# Patient Record
Sex: MALE | Race: WHITE | NOT HISPANIC OR LATINO
[De-identification: names, ages, dates, MRNs, and addresses within clinical notes are randomized per-mention and may not be internally consistent; named-entity substitution may affect disease eponyms.]

---

## 2019-06-09 ENCOUNTER — TRANSCRIPTION ENCOUNTER (OUTPATIENT)
Age: 29
End: 2019-06-09

## 2019-06-10 ENCOUNTER — INPATIENT (INPATIENT)
Facility: HOSPITAL | Age: 29
LOS: 0 days | Discharge: HOME | DRG: 343 | End: 2019-06-10
Attending: GENERAL PRACTICE | Admitting: GENERAL PRACTICE
Payer: COMMERCIAL

## 2019-06-10 ENCOUNTER — RESULT REVIEW (OUTPATIENT)
Age: 29
End: 2019-06-10

## 2019-06-10 VITALS
SYSTOLIC BLOOD PRESSURE: 148 MMHG | RESPIRATION RATE: 15 BRPM | HEART RATE: 65 BPM | DIASTOLIC BLOOD PRESSURE: 78 MMHG | OXYGEN SATURATION: 99 %

## 2019-06-10 VITALS
HEART RATE: 65 BPM | DIASTOLIC BLOOD PRESSURE: 106 MMHG | OXYGEN SATURATION: 99 % | RESPIRATION RATE: 17 BRPM | SYSTOLIC BLOOD PRESSURE: 163 MMHG | TEMPERATURE: 98 F

## 2019-06-10 DIAGNOSIS — K35.80 UNSPECIFIED ACUTE APPENDICITIS: ICD-10-CM

## 2019-06-10 DIAGNOSIS — R10.9 UNSPECIFIED ABDOMINAL PAIN: ICD-10-CM

## 2019-06-10 DIAGNOSIS — R74.8 ABNORMAL LEVELS OF OTHER SERUM ENZYMES: ICD-10-CM

## 2019-06-10 LAB
ALBUMIN SERPL ELPH-MCNC: 4.5 G/DL — SIGNIFICANT CHANGE UP (ref 3.3–5)
ALP SERPL-CCNC: 92 U/L — SIGNIFICANT CHANGE UP (ref 40–120)
ALT FLD-CCNC: 43 U/L — SIGNIFICANT CHANGE UP (ref 12–78)
ANION GAP SERPL CALC-SCNC: 5 MMOL/L — SIGNIFICANT CHANGE UP (ref 5–17)
AST SERPL-CCNC: 17 U/L — SIGNIFICANT CHANGE UP (ref 15–37)
BASOPHILS # BLD AUTO: 0.03 K/UL — SIGNIFICANT CHANGE UP (ref 0–0.2)
BASOPHILS NFR BLD AUTO: 0.4 % — SIGNIFICANT CHANGE UP (ref 0–2)
BILIRUB SERPL-MCNC: 0.8 MG/DL — SIGNIFICANT CHANGE UP (ref 0.2–1.2)
BUN SERPL-MCNC: 14 MG/DL — SIGNIFICANT CHANGE UP (ref 7–23)
CALCIUM SERPL-MCNC: 9.1 MG/DL — SIGNIFICANT CHANGE UP (ref 8.5–10.1)
CHLORIDE SERPL-SCNC: 104 MMOL/L — SIGNIFICANT CHANGE UP (ref 96–108)
CO2 SERPL-SCNC: 30 MMOL/L — SIGNIFICANT CHANGE UP (ref 22–31)
CREAT SERPL-MCNC: 0.99 MG/DL — SIGNIFICANT CHANGE UP (ref 0.5–1.3)
EOSINOPHIL # BLD AUTO: 0.13 K/UL — SIGNIFICANT CHANGE UP (ref 0–0.5)
EOSINOPHIL NFR BLD AUTO: 1.7 % — SIGNIFICANT CHANGE UP (ref 0–6)
GLUCOSE SERPL-MCNC: 98 MG/DL — SIGNIFICANT CHANGE UP (ref 70–99)
HCT VFR BLD CALC: 46.1 % — SIGNIFICANT CHANGE UP (ref 39–50)
HGB BLD-MCNC: 16.5 G/DL — SIGNIFICANT CHANGE UP (ref 13–17)
IMM GRANULOCYTES NFR BLD AUTO: 0.3 % — SIGNIFICANT CHANGE UP (ref 0–1.5)
LIDOCAIN IGE QN: 1962 U/L — HIGH (ref 73–393)
LYMPHOCYTES # BLD AUTO: 1.87 K/UL — SIGNIFICANT CHANGE UP (ref 1–3.3)
LYMPHOCYTES # BLD AUTO: 24.5 % — SIGNIFICANT CHANGE UP (ref 13–44)
MCHC RBC-ENTMCNC: 30.9 PG — SIGNIFICANT CHANGE UP (ref 27–34)
MCHC RBC-ENTMCNC: 35.8 GM/DL — SIGNIFICANT CHANGE UP (ref 32–36)
MCV RBC AUTO: 86.3 FL — SIGNIFICANT CHANGE UP (ref 80–100)
MONOCYTES # BLD AUTO: 0.53 K/UL — SIGNIFICANT CHANGE UP (ref 0–0.9)
MONOCYTES NFR BLD AUTO: 6.9 % — SIGNIFICANT CHANGE UP (ref 2–14)
NEUTROPHILS # BLD AUTO: 5.05 K/UL — SIGNIFICANT CHANGE UP (ref 1.8–7.4)
NEUTROPHILS NFR BLD AUTO: 66.2 % — SIGNIFICANT CHANGE UP (ref 43–77)
NRBC # BLD: 0 /100 WBCS — SIGNIFICANT CHANGE UP (ref 0–0)
PLATELET # BLD AUTO: 236 K/UL — SIGNIFICANT CHANGE UP (ref 150–400)
POTASSIUM SERPL-MCNC: 3.6 MMOL/L — SIGNIFICANT CHANGE UP (ref 3.5–5.3)
POTASSIUM SERPL-SCNC: 3.6 MMOL/L — SIGNIFICANT CHANGE UP (ref 3.5–5.3)
PROT SERPL-MCNC: 7.6 G/DL — SIGNIFICANT CHANGE UP (ref 6–8.3)
RBC # BLD: 5.34 M/UL — SIGNIFICANT CHANGE UP (ref 4.2–5.8)
RBC # FLD: 12.2 % — SIGNIFICANT CHANGE UP (ref 10.3–14.5)
SODIUM SERPL-SCNC: 139 MMOL/L — SIGNIFICANT CHANGE UP (ref 135–145)
WBC # BLD: 7.63 K/UL — SIGNIFICANT CHANGE UP (ref 3.8–10.5)
WBC # FLD AUTO: 7.63 K/UL — SIGNIFICANT CHANGE UP (ref 3.8–10.5)

## 2019-06-10 PROCEDURE — 88304 TISSUE EXAM BY PATHOLOGIST: CPT | Mod: 26

## 2019-06-10 PROCEDURE — ZZZZZ: CPT

## 2019-06-10 PROCEDURE — 86850 RBC ANTIBODY SCREEN: CPT

## 2019-06-10 PROCEDURE — 86901 BLOOD TYPING SEROLOGIC RH(D): CPT

## 2019-06-10 PROCEDURE — 86900 BLOOD TYPING SEROLOGIC ABO: CPT

## 2019-06-10 PROCEDURE — 80053 COMPREHEN METABOLIC PANEL: CPT

## 2019-06-10 PROCEDURE — 88304 TISSUE EXAM BY PATHOLOGIST: CPT

## 2019-06-10 PROCEDURE — 99285 EMERGENCY DEPT VISIT HI MDM: CPT

## 2019-06-10 PROCEDURE — 74177 CT ABD & PELVIS W/CONTRAST: CPT | Mod: 26

## 2019-06-10 PROCEDURE — 85027 COMPLETE CBC AUTOMATED: CPT

## 2019-06-10 PROCEDURE — 96374 THER/PROPH/DIAG INJ IV PUSH: CPT

## 2019-06-10 PROCEDURE — 99285 EMERGENCY DEPT VISIT HI MDM: CPT | Mod: 25

## 2019-06-10 PROCEDURE — 74177 CT ABD & PELVIS W/CONTRAST: CPT

## 2019-06-10 PROCEDURE — 83690 ASSAY OF LIPASE: CPT

## 2019-06-10 PROCEDURE — 36415 COLL VENOUS BLD VENIPUNCTURE: CPT

## 2019-06-10 PROCEDURE — C1889: CPT

## 2019-06-10 RX ORDER — ONDANSETRON 8 MG/1
4 TABLET, FILM COATED ORAL ONCE
Refills: 0 | Status: COMPLETED | OUTPATIENT
Start: 2019-06-10 | End: 2019-06-10

## 2019-06-10 RX ORDER — HYDROMORPHONE HYDROCHLORIDE 2 MG/ML
0.5 INJECTION INTRAMUSCULAR; INTRAVENOUS; SUBCUTANEOUS
Refills: 0 | Status: DISCONTINUED | OUTPATIENT
Start: 2019-06-10 | End: 2019-06-10

## 2019-06-10 RX ORDER — IOHEXOL 300 MG/ML
30 INJECTION, SOLUTION INTRAVENOUS ONCE
Refills: 0 | Status: COMPLETED | OUTPATIENT
Start: 2019-06-10 | End: 2019-06-10

## 2019-06-10 RX ORDER — CEFOTETAN DISODIUM 1 G
2 VIAL (EA) INJECTION ONCE
Refills: 0 | Status: DISCONTINUED | OUTPATIENT
Start: 2019-06-10 | End: 2019-06-10

## 2019-06-10 RX ORDER — IBUPROFEN 200 MG
400 TABLET ORAL EVERY 6 HOURS
Refills: 0 | Status: DISCONTINUED | OUTPATIENT
Start: 2019-06-10 | End: 2019-06-10

## 2019-06-10 RX ORDER — METOCLOPRAMIDE HCL 10 MG
5 TABLET ORAL ONCE
Refills: 0 | Status: DISCONTINUED | OUTPATIENT
Start: 2019-06-10 | End: 2019-06-10

## 2019-06-10 RX ORDER — SODIUM CHLORIDE 9 MG/ML
1000 INJECTION INTRAMUSCULAR; INTRAVENOUS; SUBCUTANEOUS ONCE
Refills: 0 | Status: COMPLETED | OUTPATIENT
Start: 2019-06-10 | End: 2019-06-10

## 2019-06-10 RX ORDER — IBUPROFEN 200 MG
400 TABLET ORAL
Qty: 0 | Refills: 0 | DISCHARGE
Start: 2019-06-10

## 2019-06-10 RX ORDER — SODIUM CHLORIDE 9 MG/ML
1000 INJECTION, SOLUTION INTRAVENOUS
Refills: 0 | Status: DISCONTINUED | OUTPATIENT
Start: 2019-06-10 | End: 2019-06-10

## 2019-06-10 RX ADMIN — SODIUM CHLORIDE 1000 MILLILITER(S): 9 INJECTION INTRAMUSCULAR; INTRAVENOUS; SUBCUTANEOUS at 11:48

## 2019-06-10 RX ADMIN — ONDANSETRON 4 MILLIGRAM(S): 8 TABLET, FILM COATED ORAL at 12:07

## 2019-06-10 RX ADMIN — IOHEXOL 30 MILLILITER(S): 300 INJECTION, SOLUTION INTRAVENOUS at 11:49

## 2019-06-10 RX ADMIN — SODIUM CHLORIDE 125 MILLILITER(S): 9 INJECTION, SOLUTION INTRAVENOUS at 21:53

## 2019-06-10 NOTE — ASU DISCHARGE PLAN (ADULT/PEDIATRIC) - CARE PROVIDER_API CALL
John Kevin)  ColonRectal Surgery; Surgery  [address] [place] [address]  Phone: [phone]  Fax: [phone]  Follow Up Time:

## 2019-06-10 NOTE — ED PROVIDER NOTE - PHYSICAL EXAMINATION
Gen: Awake, Alert, WD, WN, NAD  Head:  NC/AT  Eyes:  PERRL, EOMI, Conjunctiva pink, lids normal, no scleral icterus  ENT: OP clear, no exudates, no erythema, uvula midline, moist mucus membranes  Neck: supple, nontender, no LAD, trachea midline  Cardiac/CV:  S1 S2, RRR, no M/G/R  Respiratory/Pulm:  CTAB, good air movement, normal resp effort, no wheezes/stridor/retractions/rales/rhonchi  Gastrointestinal/Abdomen:  Soft, + tender over [place], nondistended, +BS, no rebound/guarding  Back:  no CVAT, no MLT  Ext:  warm, well perfused, moving all extremities spontaneously, no peripheral edema, distal pulses intact  Skin: intact, no rash  Neuro:  AAOx3, sensation intact, motor 5/5 x 4 extremities, normal gait, speech clear

## 2019-06-10 NOTE — H&P ADULT - HISTORY OF PRESENT ILLNESS
30 yo male who developed right lower quadrant abdominal pain 3 days ago, pain 9/10, not relieved by anything, nausea, no appetite. Ct Showed acute appendicitis (15mm appendix, stranding), elevated lipase, normal WBC.

## 2019-06-10 NOTE — BRIEF OPERATIVE NOTE - NSICDXBRIEFPREOP_GEN_ALL_CORE_FT
PRE-OP DIAGNOSIS:  Acute appendicitis with localized peritonitis 10-Bakari-2019 20:00:56  John Kevin

## 2019-06-10 NOTE — H&P ADULT - NSHPPHYSICALEXAM_GEN_ALL_CORE
.  VITAL SIGNS:  T(C): 36.9 (06-10-19 @ 12:13), Max: 36.9 (06-10-19 @ 10:39)  T(F): 98.5 (06-10-19 @ 12:13), Max: 98.5 (06-10-19 @ 10:39)  HR: 64 (06-10-19 @ 12:13) (64 - 65)  BP: 124/74 (06-10-19 @ 12:13) (124/74 - 163/106)  BP(mean): --  RR: 16 (06-10-19 @ 12:13) (16 - 17)  SpO2: 99% (06-10-19 @ 12:13) (99% - 99%)  Wt(kg): --    PHYSICAL EXAM:    Constitutional: resting comfortably in bed; NAD  Eyes: PERRL, EOMI, anicteric sclera  ENT: no nasal discharge; uvula midline, no oropharyngeal erythema or exudates  Neck: supple; no JVD or thyromegaly  Respiratory: CTA B/L; no W/R/R, no retractions  Cardiac: +S1/S2; RRR; no murmurs  Gastrointestinal: RLQ tenderness, rebound  Back: spine midline, no bony tenderness or step-offs; no CVAT B/L  Extremities: no clubbing or cyanosis; no peripheral edema  Musculoskeletal: NROM x4; no joint swelling, tenderness or erythema  Vascular: 2+ radial, femoral, DP/PT pulses B/L  Dermatologic: skin warm, dry and intact; no rashes, wounds, or scars  Lymphatic: no submandibular or cervical LAD  Neurologic: AAOx3; CNII-XII grossly intact; no focal deficits  Psychiatric: affect and characteristics of appearance, verbalizations, behaviors are appropriate

## 2019-06-10 NOTE — ED ADULT NURSE NOTE - OBJECTIVE STATEMENT
30 y/o male with dull constant RLQ abdominal pain since Friday. +Nausea. Denies vomiting, diarrhea, fever and urinary symptoms.

## 2019-06-10 NOTE — H&P ADULT - ASSESSMENT
28 yo male with acute appendicitis  -Will take him to OR for lap appendectomy/poss open. Risk/benefit of surgery explained to patient, including but not limited to intrabdominal abscess, wound infection, bleeding, DVT, PE  -IV cefotetan  -NPO  -IVF

## 2019-06-10 NOTE — CONSULT NOTE ADULT - PROBLEM SELECTOR RECOMMENDATION 9
f/u ct a/p, r/o appendicitis, eval for ?possible subclinical pancreatitis  check amylase  lfts wnl  npo/ivf  ppi ppx, anti emetics prn  pain control prn  monitor exam  further recs and work up pending above

## 2019-06-10 NOTE — ED PROVIDER NOTE - CARE PLAN
Principal Discharge DX:	Acute appendicitis, unspecified acute appendicitis type  Secondary Diagnosis:	Acute pancreatitis with infected necrosis, unspecified pancreatitis type

## 2019-06-10 NOTE — BRIEF OPERATIVE NOTE - NSICDXBRIEFPOSTOP_GEN_ALL_CORE_FT
POST-OP DIAGNOSIS:  Acute appendicitis with localized peritonitis 10-Bakari-2019 20:01:06  John Kevin

## 2019-06-10 NOTE — H&P ADULT - NSHPLABSRESULTS_GEN_ALL_CORE
Vital Signs Last 24 Hrs  T(C): 36.9 (10 Bakari 2019 12:13), Max: 36.9 (10 Bakari 2019 10:39)  T(F): 98.5 (10 Bakari 2019 12:13), Max: 98.5 (10 Bakari 2019 10:39)  HR: 64 (10 Bakari 2019 12:13) (64 - 65)  BP: 124/74 (10 Bakari 2019 12:13) (124/74 - 163/106)  BP(mean): --  RR: 16 (10 Bakari 2019 12:13) (16 - 17)  SpO2: 99% (10 Bakari 2019 12:13) (99% - 99%)                          16.5   7.63  )-----------( 236      ( 10 Bakari 2019 12:05 )             46.1       06-10    139  |  104  |  14  ----------------------------<  98  3.6   |  30  |  0.99    Ca    9.1      10 Bakari 2019 12:05    TPro  7.6  /  Alb  4.5  /  TBili  0.8  /  DBili  x   /  AST  17  /  ALT  43  /  AlkPhos  92  06-10      LIVER FUNCTIONS - ( 10 Bakari 2019 12:05 )  Alb: 4.5 g/dL / Pro: 7.6 g/dL / ALK PHOS: 92 U/L / ALT: 43 U/L / AST: 17 U/L / GGT: x             MEDICATIONS  (STANDING):    MEDICATIONS  (PRN):      MEDICATIONS  (STANDING):      < from: CT Abdomen and Pelvis w/ Oral Cont and w/ IV Cont (06.10.19 @ 13:57) >    INDINGS:    LOWER CHEST: Within normal limits.    LIVER: Too small to characterize hypodensity left hepatic lobe likely   cyst.  BILE DUCTS: Normal caliber.  GALLBLADDER: Within normal limits.  SPLEEN: Within normal limits.  PANCREAS: Within normal limits.  ADRENALS: Within normal limits.  KIDNEYS/URETERS: Within normal limits.    BLADDER: Within normal limits.  REPRODUCTIVE ORGANS: Normal for age    BOWEL: No bowel obstruction or acute inflammation. Appendix Abnormal.   Thickened, with appendicolith, fails to fill with contrast.   Periappendiceal fat stranding. Findings compatible with acute appendicitis  PERITONEUM: No ascites or extraluminal gas. No drainable collection.  VESSELS:  Within normal limits.  RETROPERITONEUM: No lymphadenopathy.    ABDOMINAL WALL: Within normal limits.  BONES: Within normal limits.    IMPRESSION:   Acute appendicitis  < end of copied text >

## 2019-06-10 NOTE — CONSULT NOTE ADULT - ASSESSMENT
30yo M c/o abd pain x 3 days, initially upper central abdomen, now migrated to lower abdomen, gi consulted

## 2019-06-10 NOTE — H&P ADULT - NSHPREVIEWOFSYSTEMS_GEN_ALL_CORE
REVIEW OF SYSTEMS:    CONSTITUTIONAL: No weakness, fevers or chills  EYES/ENT: No visual changes;  No vertigo or throat pain   NECK: No pain or stiffness  RESPIRATORY: No cough, wheezing, hemoptysis; No shortness of breath  CARDIOVASCULAR: No chest pain or palpitations  GASTROINTESTINAL:  abdominal  pain.  nausea,no  vomiting, or hematemesis; No diarrhea or constipation. No melena or hematochezia.  GENITOURINARY: No dysuria, frequency or hematuria  NEUROLOGICAL: No numbness or weakness  SKIN: No itching, burning, rashes, or lesions   All other review of systems is negative unless indicated above.

## 2019-06-10 NOTE — ED PROVIDER NOTE - PROGRESS NOTE DETAILS
pt comfortable, tolerating PO contrast, given copy of lab results, aware of elvated lipase, need for CT with IV contrast, agrees with plan, aware may have pancreatitis, st not a regular or heavy drinker, had 2 beers the other day.

## 2019-06-10 NOTE — ASU DISCHARGE PLAN (ADULT/PEDIATRIC) - CALL YOUR DOCTOR IF YOU HAVE ANY OF THE FOLLOWING:
Fever greater than (need to indicate Fahrenheit or Celsius) Bleeding that does not stop/Fever greater than (need to indicate Fahrenheit or Celsius)/Unable to urinate/Pain not relieved by Medications

## 2019-06-10 NOTE — CONSULT NOTE ADULT - SUBJECTIVE AND OBJECTIVE BOX
Chief Complaint:  Patient is a 29y old  Male who presents with a chief complaint of   Heart murmur  No significant past surgical history     HPI: 30yo M c/o abd pain x 3 days, initially upper central abdomen, now migrated to lower abdomen. pt denies trauma, travel, n/v/d, fever, chills, bladder, bowel changes, ha, dizzy, loc, numbness, tingling, no new back pain (other than usual chronic). pt st abd pain achy, feels like gas. none radiating, sometimes changes with sitting up. last normal BM 2 hours ago. pt refusing pain or anti nausea meds at this time.      gi consulted for abd pain/?pancreatitis. chart reviewed. pt seen and examined. pt reports abd pain which started this past friday, came to ed given worsening severity; initially thought gas pains, present in epigastric region now present in rlq, non radiating, never experienced before. [place] [facility] [place] did eat a burrito at [employer] on friday w gf, but no one else sick and felt fine afterwards.  denies associated f/c/n/v/d/c/melena/brbpr. denies recent sick contacts/hospitalizations/abx use,. last bm this am and normal. hx of remote egd for 'eating issues,' normal per pt report. has never had colon. denies prior abd sx. thinks sister may have 'mild crohns' maternal grandmother w hx of stomach ca. meds nc. recently drank 2 beers, social  use, denies drug use/smoking    recent vs/labs reviewed afebrile  no leukocytosis  lipase 1962  ct pending      No Known Allergies            FAMILY HISTORY:        Review of Systems:    General:  No wt loss, fevers, chills, night sweats, fatigue  Eyes:  Good vision, no reported pain  ENT:  No sore throat, pain, runny nose, dysphagia  CV:  No pain, palpitations, no lightheadedness  Resp:  No dyspnea, cough, tachypnea, wheezing  GI: see above  :  No pain, bleeding, incontinence, nocturia  Muscle:  No pain, weakness  Neuro:  No weakness, tingling, memory problems  Psych:  No fatigue, insomnia, mood problems, depression  Endocrine:  No polyuria, polydypsia, cold/heat intolerance  Heme:  No petechiae, ecchymosis, easy bruisability  Skin:  No rash, tattoos, scars, edema    Relevant Family History:   n/c    Relevant Social History: n/c      Physical Exam:    Vital Signs:  Vital Signs Last 24 Hrs  T(C): 36.9 (10 Bakari 2019 12:13), Max: 36.9 (10 Bakari 2019 10:39)  T(F): 98.5 (10 Bakari 2019 12:13), Max: 98.5 (10 Bakari 2019 10:39)  HR: 64 (10 Bakari 2019 12:13) (64 - 65)  BP: 124/74 (10 Bakari 2019 12:13) (124/74 - 163/106)  BP(mean): --  RR: 16 (10 Bakari 2019 12:13) (16 - 17)  SpO2: 99% (10 Bakari 2019 12:13) (99% - 99%)  Daily     Daily     General:  nad non toxic appearing  HEENT:  NC/AT  Chest:  dec bs  Cardiovascular:  Regular rhythm, S1, S2  Abdomen:  soft ttp rlp to deep palpation no guarding mild dt  Extremities:  no  edema  Skin:  No rash  Neuro/Psych:  A&Ox3    Laboratory:                            16.5   7.63  )-----------( 236      ( 10 Bakari 2019 12:05 )             46.1     06-10    139  |  104  |  14  ----------------------------<  98  3.6   |  30  |  0.99    Ca    9.1      10 Bakari 2019 12:05    TPro  7.6  /  Alb  4.5  /  TBili  0.8  /  DBili  x   /  AST  17  /  ALT  43  /  AlkPhos  92  06-10    LIVER FUNCTIONS - ( 10 Bakari 2019 12:05 )  Alb: 4.5 g/dL / Pro: 7.6 g/dL / ALK PHOS: 92 U/L / ALT: 43 U/L / AST: 17 U/L / GGT: x               Amylase Serum--      Lipase [id number]       Ammonia--    Imaging: Chief Complaint:  Patient is a 29y old  Male who presents with a chief complaint of   Heart murmur  No significant past surgical history     HPI: 28yo M c/o abd pain x 3 days, initially upper central abdomen, now migrated to lower abdomen. pt denies trauma, travel, n/v/d, fever, chills, bladder, bowel changes, ha, dizzy, loc, numbness, tingling, no new back pain (other than usual chronic). pt st abd pain achy, feels like gas. none radiating, sometimes changes with sitting up. last normal BM 2 hours ago. pt refusing pain or anti nausea meds at this time.      gi consulted for abd pain/?pancreatitis. chart reviewed. pt seen and examined. pt reports abd pain which started this past friday, came to ed given worsening severity; initially thought gas pains, present in epigastric region now present in rlq, non radiating, never experienced before. prior in [facility] [place]. did eat a burrito at [employer] on friday w gf, but no one else sick and felt fine afterwards.  denies associated f/c/n/v/d/c/melena/brbpr. denies recent sick contacts/hospitalizations/abx use,. last bm this am and normal. hx of remote egd for 'eating issues,' normal per pt report. has never had colon. denies prior abd sx. thinks sister may have 'mild crohns' maternal grandmother w hx of stomach ca. meds nc. recently drank 2 beers, social  use, denies drug use/smoking    recent vs/labs reviewed afebrile  no leukocytosis  lipase 1962  ct pending      No Known Allergies            FAMILY HISTORY:        Review of Systems:    General:  No wt loss, fevers, chills, night sweats, fatigue  Eyes:  Good vision, no reported pain  ENT:  No sore throat, pain, runny nose, dysphagia  CV:  No pain, palpitations, no lightheadedness  Resp:  No dyspnea, cough, tachypnea, wheezing  GI: see above  :  No pain, bleeding, incontinence, nocturia  Muscle:  No pain, weakness  Neuro:  No weakness, tingling, memory problems  Psych:  No fatigue, insomnia, mood problems, depression  Endocrine:  No polyuria, polydypsia, cold/heat intolerance  Heme:  No petechiae, ecchymosis, easy bruisability  Skin:  No rash, tattoos, scars, edema    Relevant Family History:   n/c    Relevant Social History: n/c      Physical Exam:    Vital Signs:  Vital Signs Last 24 Hrs  T(C): 36.9 (10 Bakari 2019 12:13), Max: 36.9 (10 Bakari 2019 10:39)  T(F): 98.5 (10 Bakari 2019 12:13), Max: 98.5 (10 Bakari 2019 10:39)  HR: 64 (10 Bakari 2019 12:13) (64 - 65)  BP: 124/74 (10 Bakari 2019 12:13) (124/74 - 163/106)  BP(mean): --  RR: 16 (10 Bakari 2019 12:13) (16 - 17)  SpO2: 99% (10 Bakari 2019 12:13) (99% - 99%)  Daily     Daily     General:  nad non toxic appearing  HEENT:  NC/AT  Chest:  dec bs  Cardiovascular:  Regular rhythm, S1, S2  Abdomen:  soft ttp rlq to deep palpation no guarding mild dt  Extremities:  no  edema  Skin:  No rash  Neuro/Psych:  A&Ox3    Laboratory:                            16.5   7.63  )-----------( 236      ( 10 Bakari 2019 12:05 )             46.1     06-10    139  |  104  |  14  ----------------------------<  98  3.6   |  30  |  0.99    Ca    9.1      10 Bakari 2019 12:05    TPro  7.6  /  Alb  4.5  /  TBili  0.8  /  DBili  x   /  AST  17  /  ALT  43  /  AlkPhos  92  06-10    LIVER FUNCTIONS - ( 10 Bakari 2019 12:05 )  Alb: 4.5 g/dL / Pro: 7.6 g/dL / ALK PHOS: 92 U/L / ALT: 43 U/L / AST: 17 U/L / GGT: x               Amylase Serum--      Lipase [id number]       Ammonia--    Imaging:

## 2019-11-19 PROBLEM — R01.1 CARDIAC MURMUR, UNSPECIFIED: Chronic | Status: ACTIVE | Noted: 2019-06-10

## 2019-11-20 PROBLEM — Z00.00 ENCOUNTER FOR PREVENTIVE HEALTH EXAMINATION: Status: ACTIVE | Noted: 2019-11-20

## 2019-11-21 ENCOUNTER — APPOINTMENT (OUTPATIENT)
Dept: CT IMAGING | Facility: CLINIC | Age: 29
End: 2019-11-21
Payer: COMMERCIAL

## 2019-11-21 ENCOUNTER — OUTPATIENT (OUTPATIENT)
Dept: OUTPATIENT SERVICES | Facility: HOSPITAL | Age: 29
LOS: 1 days | End: 2019-11-21
Payer: COMMERCIAL

## 2019-11-21 DIAGNOSIS — Z00.8 ENCOUNTER FOR OTHER GENERAL EXAMINATION: ICD-10-CM

## 2019-11-21 PROCEDURE — 70481 CT ORBIT/EAR/FOSSA W/DYE: CPT

## 2019-11-21 PROCEDURE — 70481 CT ORBIT/EAR/FOSSA W/DYE: CPT | Mod: 26

## 2022-08-17 NOTE — ASU DISCHARGE PLAN (ADULT/PEDIATRIC) - NS DC INTERPRETER YES NO
Patient in today for gabapentin refills  100mg BID for DM neuropathy.  Dayron appropriate. Could you please send 6 matt supply. Thanks   No

## 2024-03-09 ENCOUNTER — TRANSCRIPTION ENCOUNTER (OUTPATIENT)
Age: 34
End: 2024-03-09

## 2024-03-09 ENCOUNTER — EMERGENCY (EMERGENCY)
Facility: HOSPITAL | Age: 34
LOS: 0 days | Discharge: HOME | End: 2024-03-10
Attending: HOSPITALIST
Payer: COMMERCIAL

## 2024-03-09 VITALS — HEIGHT: 75 IN | WEIGHT: 240.08 LBS

## 2024-03-09 DIAGNOSIS — R45.0 NERVOUSNESS: ICD-10-CM

## 2024-03-09 PROCEDURE — 99285 EMERGENCY DEPT VISIT HI MDM: CPT | Mod: 25

## 2024-03-09 PROCEDURE — 83880 ASSAY OF NATRIURETIC PEPTIDE: CPT

## 2024-03-09 PROCEDURE — 87086 URINE CULTURE/COLONY COUNT: CPT

## 2024-03-09 PROCEDURE — 84484 ASSAY OF TROPONIN QUANT: CPT

## 2024-03-09 PROCEDURE — 80053 COMPREHEN METABOLIC PANEL: CPT

## 2024-03-09 PROCEDURE — 36415 COLL VENOUS BLD VENIPUNCTURE: CPT

## 2024-03-09 PROCEDURE — 83735 ASSAY OF MAGNESIUM: CPT

## 2024-03-09 PROCEDURE — 71045 X-RAY EXAM CHEST 1 VIEW: CPT

## 2024-03-09 PROCEDURE — 81003 URINALYSIS AUTO W/O SCOPE: CPT

## 2024-03-09 PROCEDURE — 99285 EMERGENCY DEPT VISIT HI MDM: CPT

## 2024-03-09 PROCEDURE — 93010 ELECTROCARDIOGRAM REPORT: CPT

## 2024-03-09 PROCEDURE — 85025 COMPLETE CBC W/AUTO DIFF WBC: CPT

## 2024-03-09 PROCEDURE — 93005 ELECTROCARDIOGRAM TRACING: CPT

## 2024-03-09 PROCEDURE — 84443 ASSAY THYROID STIM HORMONE: CPT

## 2024-03-09 NOTE — ED ADULT TRIAGE NOTE - CHIEF COMPLAINT QUOTE
pt ambulatory to the ED with see chief complaint. Pt has hx of coarctation of the aorta. Pt endorses for 5 or 6 days now he's been more jittery, feels a "flutter" in his heart and his urine has been darker in the morning. Pt also endorses having intermittent SOB, not currently in triage. STAT EKG in triage.

## 2024-03-09 NOTE — ED ADULT TRIAGE NOTE - HISTORY OF COVID-19 VACCINATION
Patient is currently hospitalized with Expected Discharge Date: 10/21/2022     Will continue to monitor at this time for discharge   Vaccine status unknown

## 2024-03-10 VITALS
TEMPERATURE: 98 F | OXYGEN SATURATION: 98 % | HEART RATE: 72 BPM | RESPIRATION RATE: 17 BRPM | SYSTOLIC BLOOD PRESSURE: 128 MMHG | DIASTOLIC BLOOD PRESSURE: 65 MMHG

## 2024-03-10 LAB
ALBUMIN SERPL ELPH-MCNC: 4.4 G/DL — SIGNIFICANT CHANGE UP (ref 3.3–5)
ALP SERPL-CCNC: 86 U/L — SIGNIFICANT CHANGE UP (ref 40–120)
ALT FLD-CCNC: 97 U/L — HIGH (ref 12–78)
ANION GAP SERPL CALC-SCNC: 5 MMOL/L — SIGNIFICANT CHANGE UP (ref 5–17)
APPEARANCE UR: CLEAR — SIGNIFICANT CHANGE UP
AST SERPL-CCNC: 32 U/L — SIGNIFICANT CHANGE UP (ref 15–37)
BASOPHILS # BLD AUTO: 0.03 K/UL — SIGNIFICANT CHANGE UP (ref 0–0.2)
BASOPHILS NFR BLD AUTO: 0.4 % — SIGNIFICANT CHANGE UP (ref 0–2)
BILIRUB SERPL-MCNC: 0.6 MG/DL — SIGNIFICANT CHANGE UP (ref 0.2–1.2)
BILIRUB UR-MCNC: NEGATIVE — SIGNIFICANT CHANGE UP
BUN SERPL-MCNC: 12 MG/DL — SIGNIFICANT CHANGE UP (ref 7–23)
CALCIUM SERPL-MCNC: 9.3 MG/DL — SIGNIFICANT CHANGE UP (ref 8.5–10.1)
CHLORIDE SERPL-SCNC: 104 MMOL/L — SIGNIFICANT CHANGE UP (ref 96–108)
CO2 SERPL-SCNC: 32 MMOL/L — HIGH (ref 22–31)
COLOR SPEC: YELLOW — SIGNIFICANT CHANGE UP
CREAT SERPL-MCNC: 0.96 MG/DL — SIGNIFICANT CHANGE UP (ref 0.5–1.3)
DIFF PNL FLD: NEGATIVE — SIGNIFICANT CHANGE UP
EGFR: 108 ML/MIN/1.73M2 — SIGNIFICANT CHANGE UP
EOSINOPHIL # BLD AUTO: 0.13 K/UL — SIGNIFICANT CHANGE UP (ref 0–0.5)
EOSINOPHIL NFR BLD AUTO: 1.8 % — SIGNIFICANT CHANGE UP (ref 0–6)
GLUCOSE SERPL-MCNC: 100 MG/DL — HIGH (ref 70–99)
GLUCOSE UR QL: NEGATIVE MG/DL — SIGNIFICANT CHANGE UP
HCT VFR BLD CALC: 46.9 % — SIGNIFICANT CHANGE UP (ref 39–50)
HGB BLD-MCNC: 16.6 G/DL — SIGNIFICANT CHANGE UP (ref 13–17)
IMM GRANULOCYTES NFR BLD AUTO: 0.1 % — SIGNIFICANT CHANGE UP (ref 0–0.9)
KETONES UR-MCNC: NEGATIVE MG/DL — SIGNIFICANT CHANGE UP
LEUKOCYTE ESTERASE UR-ACNC: NEGATIVE — SIGNIFICANT CHANGE UP
LYMPHOCYTES # BLD AUTO: 2.66 K/UL — SIGNIFICANT CHANGE UP (ref 1–3.3)
LYMPHOCYTES # BLD AUTO: 36.3 % — SIGNIFICANT CHANGE UP (ref 13–44)
MAGNESIUM SERPL-MCNC: 2.1 MG/DL — SIGNIFICANT CHANGE UP (ref 1.6–2.6)
MCHC RBC-ENTMCNC: 31.1 PG — SIGNIFICANT CHANGE UP (ref 27–34)
MCHC RBC-ENTMCNC: 35.4 GM/DL — SIGNIFICANT CHANGE UP (ref 32–36)
MCV RBC AUTO: 87.8 FL — SIGNIFICANT CHANGE UP (ref 80–100)
MONOCYTES # BLD AUTO: 0.57 K/UL — SIGNIFICANT CHANGE UP (ref 0–0.9)
MONOCYTES NFR BLD AUTO: 7.8 % — SIGNIFICANT CHANGE UP (ref 2–14)
NEUTROPHILS # BLD AUTO: 3.93 K/UL — SIGNIFICANT CHANGE UP (ref 1.8–7.4)
NEUTROPHILS NFR BLD AUTO: 53.6 % — SIGNIFICANT CHANGE UP (ref 43–77)
NITRITE UR-MCNC: NEGATIVE — SIGNIFICANT CHANGE UP
NT-PROBNP SERPL-SCNC: 13 PG/ML — SIGNIFICANT CHANGE UP (ref 0–125)
PH UR: 6.5 — SIGNIFICANT CHANGE UP (ref 5–8)
PLATELET # BLD AUTO: 244 K/UL — SIGNIFICANT CHANGE UP (ref 150–400)
POTASSIUM SERPL-MCNC: 3.8 MMOL/L — SIGNIFICANT CHANGE UP (ref 3.5–5.3)
POTASSIUM SERPL-SCNC: 3.8 MMOL/L — SIGNIFICANT CHANGE UP (ref 3.5–5.3)
PROT SERPL-MCNC: 7.6 GM/DL — SIGNIFICANT CHANGE UP (ref 6–8.3)
PROT UR-MCNC: NEGATIVE MG/DL — SIGNIFICANT CHANGE UP
RBC # BLD: 5.34 M/UL — SIGNIFICANT CHANGE UP (ref 4.2–5.8)
RBC # FLD: 12.2 % — SIGNIFICANT CHANGE UP (ref 10.3–14.5)
SODIUM SERPL-SCNC: 141 MMOL/L — SIGNIFICANT CHANGE UP (ref 135–145)
SP GR SPEC: 1.01 — SIGNIFICANT CHANGE UP (ref 1–1.03)
TROPONIN I, HIGH SENSITIVITY RESULT: 8.04 NG/L — SIGNIFICANT CHANGE UP
TSH SERPL-MCNC: 2.01 UU/ML — SIGNIFICANT CHANGE UP (ref 0.34–4.82)
UROBILINOGEN FLD QL: 1 MG/DL — SIGNIFICANT CHANGE UP (ref 0.2–1)
WBC # BLD: 7.33 K/UL — SIGNIFICANT CHANGE UP (ref 3.8–10.5)
WBC # FLD AUTO: 7.33 K/UL — SIGNIFICANT CHANGE UP (ref 3.8–10.5)

## 2024-03-10 PROCEDURE — 71045 X-RAY EXAM CHEST 1 VIEW: CPT | Mod: 26

## 2024-03-10 RX ORDER — SODIUM CHLORIDE 9 MG/ML
1000 INJECTION INTRAMUSCULAR; INTRAVENOUS; SUBCUTANEOUS ONCE
Refills: 0 | Status: COMPLETED | OUTPATIENT
Start: 2024-03-10 | End: 2024-03-10

## 2024-03-10 RX ADMIN — SODIUM CHLORIDE 1000 MILLILITER(S): 9 INJECTION INTRAMUSCULAR; INTRAVENOUS; SUBCUTANEOUS at 01:19

## 2024-03-10 NOTE — ED ADULT NURSE NOTE - NSFALLUNIVINTERV_ED_ALL_ED
Bed/Stretcher in lowest position, wheels locked, appropriate side rails in place/Call bell, personal items and telephone in reach/Instruct patient to call for assistance before getting out of bed/chair/stretcher/Non-slip footwear applied when patient is off stretcher/[place] to call system/Physically safe environment - no spills, clutter or unnecessary equipment/Purposeful proactive rounding/Room/bathroom lighting operational, light cord in reach

## 2024-03-10 NOTE — ED PROVIDER NOTE - OBJECTIVE STATEMENT
33-year-old male with complaints of feeling intermittent jitteriness over the past 5 or 6 days.  Patient denies any new medications or supplements or increased caffeine intake.  Denies using any energy drinks.  States he is actually been exercising at the gym for the past 2 weeks and eating healthier.  Also notes that he has had some darkening of his urine but that is been ongoing for months ever since he started taking something for his cholesterol.  Has been following with his doctors.  No other complaints.

## 2024-03-10 NOTE — ED ADULT NURSE NOTE - OBJECTIVE STATEMENT
pt presents to ED c/o jittery feeling x5 days. pt denies chest pain but states his chest feels "uneasy".  pt hx coarctation of aorta. pt recently put on new meds for HTN and HLD and believes this could be a side effect. pt has 1 bout of sob, denies it happening again since. pt is a&o x4 with no further complaints at this time.

## 2024-03-10 NOTE — ED PROVIDER NOTE - CLINICAL SUMMARY MEDICAL DECISION MAKING FREE TEXT BOX
33-year-old male presents with jittery feeling for the past week.  Will obtain labs including TSH EKG chest x-ray.  All labs resulted.  Workup normal.  Will have patient follow-up with his primary care doctor for further testing if deemed necessary.  Will discharge home.

## 2024-03-10 NOTE — ED ADULT NURSE NOTE - NSFALLASSESSNEED_ED_ALL_ED
[Abdomen Masses] : No abdominal masses [Abdomen Tenderness] : ~T No ~M abdominal tenderness [No HSM] : no hepatosplenomegaly [Normal] : was normal [None] : there was no rectal mass  [FreeTextEntry1] : The risks , benefits and alternatives of the procedure were reviewed with the patient. The patient consents to the planned procedure.\par \par The flexible sigmoidoscope was passed through the anus into the rectum. The scope was passed to  25   cm from the anal verge.\par \par The findings revealed:\par \par At 15 cm from the anal verge 3 cm polypoid mobile lesion.  Joanne ink tattoo noted at 12 cm from the anal verge.\par \par  no

## 2024-03-10 NOTE — ED PROVIDER NOTE - PATIENT PORTAL LINK FT
You can access the FollowMyHealth Patient Portal offered by [facility] by registering at the following website: [email or web address]. By joining [employer]’s FollowMyHealth portal, you will also be able to view your health information using other applications (apps) compatible with our system.

## 2024-03-11 LAB
CULTURE RESULTS: NO GROWTH — SIGNIFICANT CHANGE UP
SPECIMEN SOURCE: SIGNIFICANT CHANGE UP

## 2024-04-20 NOTE — ED PROVIDER NOTE - WR ORDER DATE AND TIME 1
GI Note    Aware of consultation for GI bleed, will plan for EGD around 3:30 today, transfuse patient, continue with IV PPI Bid, and keep NPO for now. H&H q 8 hrs and monitor for active bleeding. If patient manifests any overt bleeding and becomes HD unstable, then please contact the GI service. Formal consultation to follow. No PPI use at home query if PUD, H pylori, or neoplasm process, BUN:Cr elevated and UGI source more likely. Further recommendations to follow.     Thank you for involving the GI service in the care of your patient. Please dont hesitate to call me directly on my cell below with any questions, updates, etc.      -Romero Clayton MD  [phone]   Gastroenterology/Hepatology    Nancy Bustamante (Child)   [phone]     Nancy is POA and signed waiver for  service. Nancy also gave verbal approval to let nursing student Concha interpret while patient is in periop area.    Patient awake in bed and restless. Patient is refusing to allow staff to attempt placement of IV site. MD notified that patient is without IV at this time and not able to receive continuous fluid or IV medication. New orders received for one time dose 0.5 mg ativan PO.   Patient/caregivers speak Andorran  as their preferred language for their healthcare communication. For safe communication, use the AMN  carts or call:    Senior  Navigator Isabela Beauchamp at [phone] or   AMN phone services for [facility] at [phone]    General phone: 430-[facility]3 ( [phone])  Email: [email or web address]    Always document the use of interpreting services ('s ID number) in your clinical notes.    Our interpreters are available for team members working with limited  English proficient (LEP) patients remotely, via phone or video or in person (if needed for special cases).    When using family members to interpret, for the safety of the patient and protection of the communication of both our patient and [facility] staff the VRI or telephonic  should remain on the line to monitor that all communication is accurate and complete. The  should be instructed to notify [facility] staff immediately if there are any inaccuracies.         Thank you,        Isabela HATCH  Senior /Navigator    Patient/caregivers speak Icelandic  as their preferred language for their healthcare communication. For safe communication, use the AMN  carts or call:    Senior  Navigator Isabela Beauchamp at [phone] or   AMN phone services for [facility] at [phone]    General phone: 320-[facility]4 ( [phone])  Email: [email or web address]    Always document the use of interpreting services ('s ID number) in your clinical notes.    Our interpreters are available for team members working with limited  English proficient (LEP) patients remotely, via phone or video or in person (if needed for special cases).    When using family members to interpret, for the safety of the patient and protection of the communication of both our patient and [facility] staff the VRI or telephonic  should remain on the line to monitor that all communication is accurate and complete. The  should be instructed to notify [facility] staff immediately if there are any inaccuracies.         Thank you,        Isabela HATCH  Senior /Navigator    TRANSFER - IN REPORT:    Verbal report received from GINO Linn on Janessa Bustamante  being received from [facility] ED for routine progression of patient care      Report consisted of patient's Situation, Background, Assessment and   Recommendations(SBAR).     Information from the following report(s) ED Encounter Summary, ED SBAR, MAR, Recent Results, and Quality Measures was reviewed with the receiving nurse.    Opportunity for questions and clarification was provided.      Assessment completed upon patient's arrival to unit and care assumed.      pg/mL   POCT Urinalysis no Micro    Collection Time: 04/18/24  9:48 PM   Result Value Ref Range    Specific Gravity, Urine, POC 1.020 1.001 - 1.023      pH, Urine, POC 5.5 5.0 - 9.0      Protein, Urine, POC Negative NEG mg/dL    Glucose, UA POC Negative NEG mg/dL    Ketones, Urine, POC 15 (A) NEG mg/dL    Bilirubin, Urine, POC Negative NEG      Blood, UA POC Negative NEG      URINE UROBILINOGEN POC 0.2 0.2 - 1.0 EU/dL    Nitrite, Urine, POC Negative NEG      Leukocyte Est, UA POC TRACE (A) NEG      Performed by: Billie Grewal    Urinalysis, Micro    Collection Time: 04/18/24  9:51 PM   Result Value Ref Range    WBC, UA 0 0 /hpf    RBC, UA 0 0 /hpf    Epithelial Cells UA 0-3 0 /hpf    BACTERIA, URINE TRACE 0 /hpf    Casts 0 0 /lpf    Crystals 0 0 /LPF    Mucus, UA 0 0 /lpf   Transferrin    Collection Time: 04/18/24 11:54 PM   Result Value Ref Range    Transferrin 228 202 - 364 mg/dL   Iron    Collection Time: 04/18/24 11:54 PM   Result Value Ref Range    Iron 54 35 - 150 ug/dL   Hemoglobin and Hematocrit    Collection Time: 04/19/24  6:03 AM   Result Value Ref Range    Hemoglobin 6.7 (LL) 11.7 - 15.4 g/dL    Hematocrit 20.6 (L) 35.8 - 46.3 %   Protime-INR    Collection Time: 04/19/24  6:03 AM   Result Value Ref Range    Protime 15.2 (H) 11.3 - 14.9 sec    INR 1.2     APTT    Collection Time: 04/19/24  6:03 AM   Result Value Ref Range    APTT 28.4 23.3 - 37.4 SEC   Comprehensive Metabolic Panel w/ Reflex to MG    Collection Time: 04/19/24  6:03 AM   Result Value Ref Range    Sodium 145 136 - 146 mmol/L    Potassium 3.8 3.5 - 5.1 mmol/L    Chloride 116 (H) 103 - 113 mmol/L    CO2 25 21 - 32 mmol/L    Anion Gap 4 2 - 11 mmol/L    Glucose 109 (H) 65 - 100 mg/dL    BUN 38 (H) 8 - 23 MG/DL    Creatinine 0.83 0.6 - 1.0 MG/DL    Est, Glom Filt Rate 72 >60 ml/min/1.73m2    Calcium 7.8 (L) 8.3 - 10.4 MG/DL    Total Bilirubin 0.6 0.2 - 1.1 MG/DL    ALT 11 (L) 12 - 65 U/L    AST 11 (L) 15 - 37 U/L    Alk Phosphatase 39 (L) 50  10-Mar-2024 01:08

## 2024-05-16 DIAGNOSIS — R93.1 ABNORMAL FINDINGS ON DIAGNOSTIC IMAGING OF HEART AND CORONARY CIRCULATION: ICD-10-CM

## 2024-05-16 DIAGNOSIS — Z86.59 PERSONAL HISTORY OF OTHER MENTAL AND BEHAVIORAL DISORDERS: ICD-10-CM

## 2024-05-16 DIAGNOSIS — Z83.438 FAMILY HISTORY OF OTHER DISORDER OF LIPOPROTEIN METABOLISM AND OTHER LIPIDEMIA: ICD-10-CM

## 2024-05-16 DIAGNOSIS — Z87.898 PERSONAL HISTORY OF OTHER SPECIFIED CONDITIONS: ICD-10-CM

## 2024-05-16 DIAGNOSIS — Z78.9 OTHER SPECIFIED HEALTH STATUS: ICD-10-CM

## 2024-05-16 DIAGNOSIS — Z82.49 FAMILY HISTORY OF ISCHEMIC HEART DISEASE AND OTHER DISEASES OF THE CIRCULATORY SYSTEM: ICD-10-CM

## 2024-05-16 DIAGNOSIS — R01.1 CARDIAC MURMUR, UNSPECIFIED: ICD-10-CM

## 2024-05-16 DIAGNOSIS — H91.92 UNSPECIFIED HEARING LOSS, LEFT EAR: ICD-10-CM

## 2024-05-16 DIAGNOSIS — Z86.39 PERSONAL HISTORY OF OTHER ENDOCRINE, NUTRITIONAL AND METABOLIC DISEASE: ICD-10-CM

## 2024-05-16 RX ORDER — VALSARTAN 80 MG/1
80 TABLET, COATED ORAL AT BEDTIME
Refills: 0 | Status: ACTIVE | COMMUNITY

## 2024-05-20 ENCOUNTER — APPOINTMENT (OUTPATIENT)
Dept: CARDIOLOGY | Facility: CLINIC | Age: 34
End: 2024-05-20
Payer: COMMERCIAL

## 2024-05-20 ENCOUNTER — NON-APPOINTMENT (OUTPATIENT)
Age: 34
End: 2024-05-20

## 2024-05-20 VITALS
HEIGHT: 75 IN | DIASTOLIC BLOOD PRESSURE: 83 MMHG | OXYGEN SATURATION: 100 % | WEIGHT: 230 LBS | SYSTOLIC BLOOD PRESSURE: 132 MMHG | BODY MASS INDEX: 28.6 KG/M2 | HEART RATE: 53 BPM

## 2024-05-20 DIAGNOSIS — I10 ESSENTIAL (PRIMARY) HYPERTENSION: ICD-10-CM

## 2024-05-20 PROCEDURE — 99204 OFFICE O/P NEW MOD 45 MIN: CPT

## 2024-05-20 PROCEDURE — 93000 ELECTROCARDIOGRAM COMPLETE: CPT

## 2024-05-20 PROCEDURE — G2211 COMPLEX E/M VISIT ADD ON: CPT

## 2024-05-20 NOTE — REASON FOR VISIT
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  The patient was diagnosed with HTN, started on valsartan without adequate response. Changed to combination therapy with HCTZ to which he had adverse reactions.  He experienced anxiety attack, likely as a result of rapid BP lowering.  Echocardiograms  performed x 3 with normal biventricular function and normal LV geometry. MRI done reported noncompaction.  Seen at [facility] by Isidra Bearden with no signs of HF.   Currently patient is on metoprolol 25 mg BID for PVCs (symptomatic) and valsartan 80 mg.  He no longer takes Crestor 40 mg due to muscle aches.  SH: , no children (interested in having children but stopped with the diagnosis of LVNC). No smoking. Does not exercise because of PVCs FH: Father and sister may have LVNC. FH HTN  1.  No clinical signs of HF. No echo evidence of noncompaction. Filling pressures are normal. Suggest continuing with Dr. Bearden at [facility] 2. Seeing Dr. Matos of EP at [facility] tomorrow. Continue metoprolol for now. No PVCs on today's ECG 3.  EST for PVCs and for HTN.  Would start exercise program after results. We have reviewed current AHA exercise guidelines, including 40 minutes 4 days per week of moderate level aerobic activity and 20 minutes twice per week of strength training. 4.  Check lipids and BP in 3m after exercise, decide regarding therapy at that point.

## 2024-05-20 NOTE — ASSESSMENT
[FreeTextEntry1] : 1.  No clinical signs of HF. No echo evidence of noncompaction. Filling pressures are normal. Suggest continuing with Dr. Bearden at [facility] 2. Seeing Dr. Matos of EP at [facility] tomorrow. Continue metoprolol for now. No PVCs on today's ECG 3.  EST for PVCs and for HTN.  Would start exercise program after results. We have reviewed current AHA exercise guidelines, including 40 minutes 4 days per week of moderate level aerobic activity and 20 minutes twice per week of strength training. 4.  Check lipids and BP in 3m after exercise, decide regarding therapy at that point.

## 2024-05-21 ENCOUNTER — NON-APPOINTMENT (OUTPATIENT)
Age: 34
End: 2024-05-21

## 2024-05-21 ENCOUNTER — APPOINTMENT (OUTPATIENT)
Dept: ELECTROPHYSIOLOGY | Facility: CLINIC | Age: 34
End: 2024-05-21
Payer: COMMERCIAL

## 2024-05-21 VITALS
WEIGHT: 230 LBS | OXYGEN SATURATION: 100 % | RESPIRATION RATE: 16 BRPM | SYSTOLIC BLOOD PRESSURE: 134 MMHG | HEART RATE: 58 BPM | DIASTOLIC BLOOD PRESSURE: 68 MMHG | HEIGHT: 75 IN | BODY MASS INDEX: 28.6 KG/M2

## 2024-05-21 DIAGNOSIS — I42.8 OTHER CARDIOMYOPATHIES: ICD-10-CM

## 2024-05-21 DIAGNOSIS — I45.9 CONDUCTION DISORDER, UNSPECIFIED: ICD-10-CM

## 2024-05-21 DIAGNOSIS — K35.33 ACUTE APPENDICITIS W/ PERFORATION AND LOCALIZED PERITONITIS, W/ABSCESS: ICD-10-CM

## 2024-05-21 PROCEDURE — G2211 COMPLEX E/M VISIT ADD ON: CPT

## 2024-05-21 PROCEDURE — 99204 OFFICE O/P NEW MOD 45 MIN: CPT

## 2024-05-21 PROCEDURE — 93000 ELECTROCARDIOGRAM COMPLETE: CPT | Mod: 59

## 2024-05-21 PROCEDURE — 99214 OFFICE O/P EST MOD 30 MIN: CPT

## 2024-05-21 RX ORDER — ALPRAZOLAM 0.5 MG/1
0.5 TABLET ORAL
Qty: 30 | Refills: 0 | Status: ACTIVE | COMMUNITY
Start: 2024-04-11

## 2024-05-21 NOTE — ASSESSMENT
[FreeTextEntry1] : This is a 34-year-old man with MRI evidence of LV noncompaction, but normal-appearing echocardiogram.  He has a history of PVCs and has a PVC on his resting EKG today he has no history of syncope no history of family sudden cardiac death.  He underwent an EP study that is reportedly negative.  Will need to obtain EP study report.   Recommend extended Holter monitor to detect NSVT and quantify PVC burden.  He is scheduled for an exercise stress test.  This will help evaluate for exercise induced arrhythmia.  Continue Metoprolol tartrate now but would consider transition to Metoprolol Succinate in future once workup is complete.  He also reported having negative genetic testing. Will need to get those results.   Studies above as well as CMP, CBC, UA from 3/10 ED visit, as well as history from [facility] [facility] and note from Dr. Velásquez reviewed.

## 2024-05-21 NOTE — CARDIOLOGY SUMMARY
[de-identified] : 5/21/24 : Sinus Bradycardia 53 bpm, Ectopic ventricular beat. RBBB LAHB.  [de-identified] : 4/10/24  TTE with contrast.  Left ventricle: The cavity size is mildly dilated.  Normal thickness is present.  There is no regional wall motion abnormalities present.  The left ventricular ejection fraction is normal.  The LVEF is 62%.the E/e' ratio is suggestive of normal LA pressure.   RV: Cavity size is normal.  Right ventricular systolic function is normal. Mitral valve: The leaflets are mildly thickened.  There is trace mitral regurgitation. Aortic valve: The valve is probably trileaflet.  Leaflets are normal thickness.  There is no aortic stenosis.  There is trace valvular aortic regurgitation. Tricuspid valve: The leaflets are normal thickness.  There is trace tricuspid valve regurgitation. Pericardium, extracardiac: There is no pericardial effusion [de-identified] : 4/5/24  Gated Cardiac MRI The left ventricle is normal in size with normal systolic function, LVEF equals 56%.  The mid inferolateral and apical lateral wall segments are mildly hypokinetic.  There is prominent LV trabeculations.  Maximum end-diastolic at 9 compacted to compacted myocardial thickness ratio equals 4.3.  This meets MRI criteria for LV noncompaction.  The RV is at the upper limits of normal in size with mildly reduced systolic function, RVEF equals 45%, RV EDV over BSA equals 116 mL/m there is focal area of the RV dyskinesia.  This meets 1 MRI major criteria for ARVC.  There is evidence of thoracic aortic coarctation distal to the left subclavian artery.  The coarctation measures 17 x 15 mm.  The estimated peak velocity across the coarctation is 1.6 m/s by phase-contrast imaging.  Peak pressure gradient equals 10 mmHg.  On T2 weighted imaging there is no evidence of myocardial edema.  On delayed enhancement imaging there is no evidence of myocardial scar or fibrosis.  No evidence of significant intracardiac shunt Qp/Qs equals 1.05 by phase-contrast imaging. show

## 2024-05-21 NOTE — HISTORY OF PRESENT ILLNESS
[FreeTextEntry1] : Mr. 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 He was told  he had a cardiomyopathy as a child and suspected Coarctation of Aorta.  He was recently diagnosed with hypertension and started on Losartan and Crestor.   He had adverse events to the medication was evaluated at [facility] by the heart failure and EP teams.  He underwent MRI as below and an EP Study(report unavailable) told it was negative except for brief atrial fibrillation. He had genetic testing told it was negative.   Family history:  no children.  Father has fainted and had TIA.  Father had Loop Recorder.  Sister has fainted Cousin with CVA   Stapes implant left ear.

## 2024-05-21 NOTE — PHYSICAL EXAM
[Well Developed] : well developed [Well Nourished] : well nourished [No Acute Distress] : no acute distress [Normal Conjunctiva] : normal conjunctiva [Normal Venous Pressure] : normal venous pressure [No Carotid Bruit] : no carotid bruit [Normal S1, S2] : normal S1, S2 [No Rub] : no rub [No Gallop] : no gallop [Clear Lung Fields] : clear lung fields [Murmur] : murmur [Good Air Entry] : good air entry [No Respiratory Distress] : no respiratory distress  [Soft] : abdomen soft [Non Tender] : non-tender [No Masses/organomegaly] : no masses/organomegaly [Normal Bowel Sounds] : normal bowel sounds [Normal Gait] : normal gait [No Edema] : no edema [No Cyanosis] : no cyanosis [No Clubbing] : no clubbing [No Varicosities] : no varicosities [No Skin Lesions] : no skin lesions [No Rash] : no rash [Moves all extremities] : moves all extremities [No Focal Deficits] : no focal deficits [Normal Speech] : normal speech [Alert and Oriented] : alert and oriented [Normal memory] : normal memory [de-identified] : I/VI luz MENDIOLAB [de-identified] : Hearing loss left ear.

## 2024-05-29 ENCOUNTER — RESULT REVIEW (OUTPATIENT)
Age: 34
End: 2024-05-29

## 2024-05-30 ENCOUNTER — OUTPATIENT (OUTPATIENT)
Dept: OUTPATIENT SERVICES | Facility: HOSPITAL | Age: 34
LOS: 1 days | End: 2024-05-30
Payer: COMMERCIAL

## 2024-05-30 ENCOUNTER — APPOINTMENT (OUTPATIENT)
Dept: CV DIAGNOSTICS | Facility: HOSPITAL | Age: 34
End: 2024-05-30

## 2024-05-30 DIAGNOSIS — I10 ESSENTIAL (PRIMARY) HYPERTENSION: ICD-10-CM

## 2024-05-30 PROCEDURE — 93016 CV STRESS TEST SUPVJ ONLY: CPT | Mod: GC

## 2024-05-30 PROCEDURE — 93018 CV STRESS TEST I&R ONLY: CPT | Mod: GC

## 2024-06-17 ENCOUNTER — APPOINTMENT (OUTPATIENT)
Dept: ELECTROPHYSIOLOGY | Facility: CLINIC | Age: 34
End: 2024-06-17

## 2024-06-17 PROCEDURE — 99213 OFFICE O/P EST LOW 20 MIN: CPT

## 2024-06-17 RX ORDER — VALSARTAN AND HYDROCHLOROTHIAZIDE 80; 12.5 MG/1; MG/1
80-12.5 TABLET, FILM COATED ORAL
Qty: 30 | Refills: 0 | Status: ACTIVE | COMMUNITY
Start: 2024-03-27

## 2024-06-17 RX ORDER — ROSUVASTATIN CALCIUM 20 MG/1
20 TABLET, FILM COATED ORAL
Qty: 30 | Refills: 0 | Status: ACTIVE | COMMUNITY
Start: 2024-05-03

## 2024-06-17 RX ORDER — METOPROLOL TARTRATE 25 MG/1
25 TABLET, FILM COATED ORAL
Qty: 180 | Refills: 2 | Status: DISCONTINUED | COMMUNITY
Start: 1900-01-01 | End: 2024-06-17

## 2024-06-17 RX ORDER — VALSARTAN 40 MG/1
40 TABLET, COATED ORAL
Qty: 30 | Refills: 0 | Status: ACTIVE | COMMUNITY
Start: 2023-12-04

## 2024-06-17 RX ORDER — METOPROLOL SUCCINATE 50 MG/1
50 TABLET, EXTENDED RELEASE ORAL
Qty: 90 | Refills: 2 | Status: ACTIVE | COMMUNITY
Start: 2024-06-17 | End: 1900-01-01

## 2024-06-17 NOTE — CARDIOLOGY SUMMARY
[de-identified] : 5/21/24 : Sinus Bradycardia 53 bpm, Ectopic ventricular beat. RBBB LAHB.  [de-identified] : 5/28/24 : Zio Patch 7d 3h HR  bpm Avg 59 bpm. No VT, SVT, AF, Pauses, AVB. 15.9% PVCs.  [de-identified] : 5/30/24 : EST  1. The patient underwent stress testing using the standard [facility] protocol.     _ The patient exercised for 7 min 32 sec.     _ The test was stopped due to fatigue.     _ The peak heart rate was 139 bpm; 74 % of predicted maximal heart rate for this patient.     _ The patient achieved 9.30 METS which is consistent with fair exercise capacity.  2. Baseline electrocardiogram: sinus bradycardia at a rate of 63 bpm with right bundle branch block, no sigificant ST abnomalities, occasional premature ventricular contractions, T wave inversions leads III, V3-V4 and right axis deviation.  3. Stress electrocardiogram: No ischemic ST segment changes.  4. Patient achieved 9.30 METS, which is consistent with fair exercise capacity.  5. Arrhythmias: Occasional VPD's occurred during rest, stress and recovery, decreased with stress.  6. The Duke Treadmill Score is 7.00, which is consistent with low risk (=5) for future cardiac events. [de-identified] : 4/10/24  TTE with contrast.  Left ventricle: The cavity size is mildly dilated.  Normal thickness is present.  There is no regional wall motion abnormalities present.  The left ventricular ejection fraction is normal.  The LVEF is 62%.the E/e' ratio is suggestive of normal LA pressure.   RV: Cavity size is normal.  Right ventricular systolic function is normal. Mitral valve: The leaflets are mildly thickened.  There is trace mitral regurgitation. Aortic valve: The valve is probably trileaflet.  Leaflets are normal thickness.  There is no aortic stenosis.  There is trace valvular aortic regurgitation. Tricuspid valve: The leaflets are normal thickness.  There is trace tricuspid valve regurgitation. Pericardium, extracardiac: There is no pericardial effusion [de-identified] : 4/5/24  Gated Cardiac MRI The left ventricle is normal in size with normal systolic function, LVEF equals 56%.  The mid inferolateral and apical lateral wall segments are mildly hypokinetic.  There is prominent LV trabeculations.  Maximum end-diastolic at 9 compacted to compacted myocardial thickness ratio equals 4.3.  This meets MRI criteria for LV noncompaction.  The RV is at the upper limits of normal in size with mildly reduced systolic function, RVEF equals 45%, RV EDV over BSA equals 116 mL/m there is focal area of the RV dyskinesia.  This meets 1 MRI major criteria for ARVC.  There is evidence of thoracic aortic coarctation distal to the left subclavian artery.  The coarctation measures 17 x 15 mm.  The estimated peak velocity across the coarctation is 1.6 m/s by phase-contrast imaging.  Peak pressure gradient equals 10 mmHg.  On T2 weighted imaging there is no evidence of myocardial edema.  On delayed enhancement imaging there is no evidence of myocardial scar or fibrosis.  No evidence of significant intracardiac shunt Qp/Qs equals 1.05 by phase-contrast imaging.

## 2024-06-17 NOTE — ASSESSMENT
[FreeTextEntry1] : This is a 34-year-old man with MRI evidence of LV noncompaction, but normal-appearing echocardiogram.  He has a history of PVCs and has a PVC on his resting EKG today he has no history of syncope no history of family sudden cardiac death.  He underwent an EP study that is reportedly negative.

## 2024-06-17 NOTE — HISTORY OF PRESENT ILLNESS
[Home] : at home, [unfilled] , at the time of the visit. [Medical Office: ([facility])___] : at the medical office located in  [Verbal consent obtained from patient] : the patient, [unfilled] [FreeTextEntry1] : Mr. 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 He was told  he had a cardiomyopathy as a child and suspected Coarctation of Aorta.  He was recently diagnosed with hypertension and started on Losartan and Crestor.   He had adverse events to the medication was evaluated at [facility] by the heart failure and EP teams.  He underwent MRI as below and an EP Study(report unavailable) told it was negative except for brief atrial fibrillation. He had genetic testing told it was negative.   Family history:  no children.  Father has fainted and had TIA.  Father had Loop Recorder.  Sister has fainted Cousin with CVA   Stapes implant left ear.   No change in health since last visit. HARDY SABILLON  denies chest pain, chest pressure, shortness of breath, lightheadedness, dizziness, palpitations, syncope, presyncope, orthopnea, PND, or edema.

## 2024-09-18 ENCOUNTER — APPOINTMENT (OUTPATIENT)
Dept: CARDIOLOGY | Facility: CLINIC | Age: 34
End: 2024-09-18

## 2024-10-29 ENCOUNTER — APPOINTMENT (OUTPATIENT)
Dept: UROLOGY | Facility: CLINIC | Age: 34
End: 2024-10-29
Payer: COMMERCIAL

## 2024-10-29 VITALS
DIASTOLIC BLOOD PRESSURE: 82 MMHG | BODY MASS INDEX: 28.6 KG/M2 | SYSTOLIC BLOOD PRESSURE: 136 MMHG | WEIGHT: 230 LBS | HEIGHT: 75 IN

## 2024-10-29 DIAGNOSIS — I86.1 SCROTAL VARICES: ICD-10-CM

## 2024-10-29 PROCEDURE — 99203 OFFICE O/P NEW LOW 30 MIN: CPT

## 2024-12-23 ENCOUNTER — APPOINTMENT (OUTPATIENT)
Dept: ELECTROPHYSIOLOGY | Facility: CLINIC | Age: 34
End: 2024-12-23
Payer: COMMERCIAL

## 2024-12-23 ENCOUNTER — NON-APPOINTMENT (OUTPATIENT)
Age: 34
End: 2024-12-23

## 2024-12-23 VITALS
BODY MASS INDEX: 28.6 KG/M2 | HEART RATE: 57 BPM | HEIGHT: 75 IN | WEIGHT: 230 LBS | DIASTOLIC BLOOD PRESSURE: 55 MMHG | OXYGEN SATURATION: 100 % | SYSTOLIC BLOOD PRESSURE: 135 MMHG

## 2024-12-23 DIAGNOSIS — I42.8 OTHER CARDIOMYOPATHIES: ICD-10-CM

## 2024-12-23 PROCEDURE — 99214 OFFICE O/P EST MOD 30 MIN: CPT

## 2024-12-23 PROCEDURE — 93000 ELECTROCARDIOGRAM COMPLETE: CPT

## 2024-12-23 PROCEDURE — G2211 COMPLEX E/M VISIT ADD ON: CPT | Mod: NC

## 2024-12-23 RX ORDER — ROSUVASTATIN CALCIUM 5 MG/1
5 TABLET, FILM COATED ORAL
Qty: 30 | Refills: 0 | Status: ACTIVE | COMMUNITY
Start: 2024-10-08

## 2025-05-15 ENCOUNTER — NON-APPOINTMENT (OUTPATIENT)
Age: 35
End: 2025-05-15

## 2025-05-15 ENCOUNTER — APPOINTMENT (OUTPATIENT)
Dept: ELECTROPHYSIOLOGY | Facility: CLINIC | Age: 35
End: 2025-05-15

## 2025-05-15 VITALS — DIASTOLIC BLOOD PRESSURE: 72 MMHG | SYSTOLIC BLOOD PRESSURE: 132 MMHG

## 2025-05-15 VITALS
HEIGHT: 75 IN | HEART RATE: 89 BPM | SYSTOLIC BLOOD PRESSURE: 131 MMHG | DIASTOLIC BLOOD PRESSURE: 59 MMHG | WEIGHT: 232 LBS | OXYGEN SATURATION: 100 % | BODY MASS INDEX: 28.85 KG/M2

## 2025-05-15 DIAGNOSIS — I49.3 VENTRICULAR PREMATURE DEPOLARIZATION: ICD-10-CM

## 2025-05-15 PROCEDURE — 99214 OFFICE O/P EST MOD 30 MIN: CPT | Mod: 25

## 2025-05-15 PROCEDURE — 93000 ELECTROCARDIOGRAM COMPLETE: CPT | Mod: 59

## 2025-05-15 RX ORDER — METOPROLOL SUCCINATE 25 MG/1
25 TABLET, EXTENDED RELEASE ORAL
Qty: 90 | Refills: 3 | Status: ACTIVE | COMMUNITY
Start: 2025-05-15 | End: 1900-01-01